# Patient Record
Sex: FEMALE | Race: WHITE
[De-identification: names, ages, dates, MRNs, and addresses within clinical notes are randomized per-mention and may not be internally consistent; named-entity substitution may affect disease eponyms.]

---

## 2018-12-30 ENCOUNTER — HOSPITAL ENCOUNTER (EMERGENCY)
Dept: HOSPITAL 62 - ER | Age: 28
Discharge: HOME | End: 2018-12-30
Payer: OTHER GOVERNMENT

## 2018-12-30 VITALS — DIASTOLIC BLOOD PRESSURE: 86 MMHG | SYSTOLIC BLOOD PRESSURE: 126 MMHG

## 2018-12-30 DIAGNOSIS — S63.502A: Primary | ICD-10-CM

## 2018-12-30 DIAGNOSIS — F17.200: ICD-10-CM
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
## 2018-12-30 PROCEDURE — 73110 X-RAY EXAM OF WRIST: CPT

## 2018-12-30 PROCEDURE — 99283 EMERGENCY DEPT VISIT LOW MDM: CPT

## 2018-12-30 PROCEDURE — L3908 WHO COCK-UP NONMOLDE PRE OTS: HCPCS

## 2018-12-30 NOTE — RADIOLOGY REPORT (SQ)
EXAM DESCRIPTION:  WRIST LEFT 3 VIEWS



COMPLETED DATE/TIME:  12/30/2018 7:54 am



REASON FOR STUDY:  left wrist pain



COMPARISON:  None.



NUMBER OF VIEWS:  Three views left wrist



LIMITATIONS:  None.



FINDINGS:  There is no acute or significant bone, joint or soft tissue abnormality.

OTHER: No other significant finding.



IMPRESSION:  NORMAL STUDY.



TECHNICAL DOCUMENTATION:  JOB ID:  [id number]



Reading location - IP/workstation name: AB

## 2018-12-30 NOTE — ER DOCUMENT REPORT
HPI





- HPI


Time Seen by Provider: 12/30/18 07:33


Pain Level: 3


Notes: 





Patient is an otherwise healthy 28-year-old female who presents to the emergency

department with chief complaint of left wrist pain.  Patient reports that she 

fell last night after tripping over her dog.  Patient has not taken any 

medications this morning for the pain.








- REPRODUCTIVE


Reproductive: DENIES: Pregnant:





- MUSCULOSKELETAL


Musculoskeletal: REPORTS: Extremity pain - left wrist





Past Medical History





- Social History


Smoking Status: Current Every Day Smoker


Family History: Reviewed & Not Pertinent


Patient has suicidal ideation: No


Patient has homicidal ideation: No


Renal/ Medical History: Denies: Hx Peritoneal Dialysis





[facility] Provider Document





- CONSTITUTIONAL


Notes: 





PHYSICAL EXAMINATION:





GENERAL: Well-appearing, well-nourished and in no acute distress.





HEAD: Atraumatic, normocephalic.





EYES: Pupils equal round extraocular movements intact,  conjunctiva are normal.





ENT: Nares patent





NECK: Normal range of motion





LUNGS: No respiratory distress





Musculoskeletal: Normal range of motion, pain to left wrist with palpation.  Cap

refill less than 3 seconds, normal motor and sensation distal to injury.





NEUROLOGICAL:  Normal speech, normal gait. 





PSYCH: Normal mood, normal affect.





SKIN: Warm, Dry, normal turgor, no rashes or lesions noted.





- INFECTION CONTROL


TRAVEL OUTSIDE OF THE [place] IN LAST 30 DAYS: No





Course





- Re-evaluation


Re-evalutation: 





X-rays negative for any acute findings to include fracture or dislocation.  

Patient reports some relief of her pain after administration of medication here 

in the emergency department.  Patient will be discharged home in stable 

condition with plans to follow-up with [facility] if her pain persists.





- Vital Signs


Vital signs: 


                                        











Temp Pulse Resp BP Pulse Ox


 


 97.8 F   116 H  16   127/79 H  96 


 


 12/30/18 07:23  12/30/18 07:23  12/30/18 07:23  12/30/18 07:23  12/30/18 07:23














Discharge





- Discharge


Clinical Impression: 


Left wrist sprain


Qualifiers:


 Encounter type: initial encounter Qualified Code(s): S63.502A - Unspecified 

sprain of left wrist, initial encounter





Condition: Stable


Disposition: HOME, SELF-CARE


Additional Instructions: 


SPRAIN:


     Your injury is a sprain.  A sprain results from stretching or tearing of 

the ligaments, usually from a twisting injury.  The ligaments will require time 

and protection in order to heal properly. Many sprains are quite disabling and 

should be taken seriously.


     The usual initial treatment of sprains is cold packs, elevation, and rest 

of the injured area.  Your physician has assessed the seriousness of your 

ligament injury, and has outlined a treatment plan. Understand that this 

treatment may change, depending on how you progress.


     If a re-examination was recommended, it is important that you follow up as 

instructed.  Call the doctor any time if there is severe pain, numbness, or loss

of function in the injured area.








ICE & ELEVATION:


     Apply ice packs frequently against the painful area.  Many different 

schedules are recommended, such as "20 minutes on, 20 minutes off" or "one hour 

ice, two hours rest."  If you need to work, you may need to go longer between 

ice treatments.  You should plan to have the area ice packed AT LEAST one-fourth

of the time.


     The ice should be applied over the wrap, tape, or splint, or over a layer 

of cloth -- not directly against the skin.  Some ice bags have a built-in cloth 

and can be put directly on the skin.


     Your injured part should be elevated as much as possible over the next 48 

hours.  Try to keep the injury above the level of the heart. Avoid use of the 

injured area.  Elevation and rest will decrease the swelling.








USE OF OVER-THE-COUNTER IBUPROFEN:


     Ibuprofen (Advil, Nuprin, Medipren, Motrin IB) is a medication for fever 

and pain control.  In addition, it has anti- inflammatory effects which may be 

beneficial, especially in the treatment of injuries.


     It's best to take ibuprofen with food.  Persons with ulcer disease or aller

gy to aspirin should notify their physician of this before taking ibuprofen.


     Ibuprofen can be given every four to six hours, for a total of four doses 

daily.


     Age              Pain or fever dose          Antiinflammatory dose


     6-8 yr              200 mg (1 tab)                200 mg (1 tab)


     9-11 yr             200 mg (1 tab)                200-400 mg (1-2 tab)


     11-14 yr            200-400 mg (1-2 tab)         400 mg (2 tab)


     15-adult            400 mg (2 tab)                600 mg (3 tab)








FOLLOW-UP CARE:


If you have been referred to a physician for follow-up care, call the 

physicians office for an appointment as you were instructed or within the next 

two days.  If you experience worsening or a significant change in your symptoms,

notify the physician immediately or return to the Emergency Department at any 

time for re-evaluation.





****The x-ray today did not show any acute fracture or dislocation.  You may 

wear the cock-up splint as needed for comfort and support.  Take ibuprofen 600 

mg every 6 hours over the next several days this will help with not only pain 

but also inflammation.  Ice and elevate as outlined above.  Follow-up with your 

primary care provider in the next 5-7 days if the pain is not improving.****